# Patient Record
Sex: MALE | Race: WHITE | NOT HISPANIC OR LATINO | Employment: OTHER | URBAN - NONMETROPOLITAN AREA
[De-identification: names, ages, dates, MRNs, and addresses within clinical notes are randomized per-mention and may not be internally consistent; named-entity substitution may affect disease eponyms.]

---

## 2018-04-11 ENCOUNTER — OFFICE VISIT CONVERTED (OUTPATIENT)
Dept: FAMILY MEDICINE CLINIC | Age: 73
End: 2018-04-11
Attending: NURSE PRACTITIONER

## 2018-04-18 ENCOUNTER — OFFICE VISIT CONVERTED (OUTPATIENT)
Dept: FAMILY MEDICINE CLINIC | Age: 73
End: 2018-04-18
Attending: NURSE PRACTITIONER

## 2018-08-02 ENCOUNTER — OFFICE VISIT CONVERTED (OUTPATIENT)
Dept: FAMILY MEDICINE CLINIC | Age: 73
End: 2018-08-02
Attending: FAMILY MEDICINE

## 2019-10-16 ENCOUNTER — HOSPITAL ENCOUNTER (OUTPATIENT)
Dept: OTHER | Facility: HOSPITAL | Age: 74
Discharge: HOME | End: 2019-10-16
Attending: FAMILY MEDICINE

## 2019-10-16 ENCOUNTER — OFFICE VISIT CONVERTED (OUTPATIENT)
Dept: FAMILY MEDICINE CLINIC | Age: 74
End: 2019-10-16
Attending: FAMILY MEDICINE

## 2019-10-16 LAB
ALBUMIN SERPL-MCNC: 4.7 G/DL (ref 3.5–5)
ALBUMIN/GLOB SERPL: 1.7 {RATIO} (ref 1.4–2.6)
ALP SERPL-CCNC: 76 U/L (ref 56–155)
ALT SERPL-CCNC: 31 U/L (ref 10–40)
ANION GAP SERPL CALC-SCNC: 27 MMOL/L (ref 8–19)
AST SERPL-CCNC: 22 U/L (ref 15–50)
BILIRUB SERPL-MCNC: 0.58 MG/DL (ref 0.2–1.3)
BUN SERPL-MCNC: 17 MG/DL (ref 5–25)
BUN/CREAT SERPL: 14 {RATIO} (ref 6–20)
CALCIUM SERPL-MCNC: 10.3 MG/DL (ref 8.7–10.4)
CHLORIDE SERPL-SCNC: 110 MMOL/L (ref 99–111)
CHOLEST SERPL-MCNC: 239 MG/DL (ref 107–200)
CHOLEST/HDLC SERPL: 6 {RATIO} (ref 3–6)
CONV CO2: 20 MMOL/L (ref 22–32)
CONV TOTAL PROTEIN: 7.4 G/DL (ref 6.3–8.2)
CREAT UR-MCNC: 1.23 MG/DL (ref 0.7–1.2)
GFR SERPLBLD BASED ON 1.73 SQ M-ARVRAT: 58 ML/MIN/{1.73_M2}
GLOBULIN UR ELPH-MCNC: 2.7 G/DL (ref 2–3.5)
GLUCOSE SERPL-MCNC: 110 MG/DL (ref 70–99)
HDLC SERPL-MCNC: 40 MG/DL (ref 40–60)
LDLC SERPL CALC-MCNC: 156 MG/DL (ref 70–100)
OSMOLALITY SERPL CALC.SUM OF ELEC: 316 MOSM/KG (ref 273–304)
POTASSIUM SERPL-SCNC: 4.8 MMOL/L (ref 3.5–5.3)
PSA SERPL-MCNC: 5.89 NG/ML (ref 0–4)
SODIUM SERPL-SCNC: 152 MMOL/L (ref 135–147)
TRIGL SERPL-MCNC: 215 MG/DL (ref 40–150)
VLDLC SERPL-MCNC: 43 MG/DL (ref 5–37)

## 2019-10-30 ENCOUNTER — HOSPITAL ENCOUNTER (OUTPATIENT)
Dept: OTHER | Facility: HOSPITAL | Age: 74
Discharge: HOME | End: 2019-10-30
Attending: FAMILY MEDICINE

## 2019-10-30 LAB
ALBUMIN SERPL-MCNC: 4.2 G/DL (ref 3.5–5)
ALBUMIN/GLOB SERPL: 1.7 {RATIO} (ref 1.4–2.6)
ALP SERPL-CCNC: 71 U/L (ref 56–155)
ALT SERPL-CCNC: 27 U/L (ref 10–40)
ANION GAP SERPL CALC-SCNC: 17 MMOL/L (ref 8–19)
AST SERPL-CCNC: 19 U/L (ref 15–50)
BILIRUB SERPL-MCNC: 0.41 MG/DL (ref 0.2–1.3)
BUN SERPL-MCNC: 17 MG/DL (ref 5–25)
BUN/CREAT SERPL: 13 {RATIO} (ref 6–20)
CALCIUM SERPL-MCNC: 9.6 MG/DL (ref 8.7–10.4)
CHLORIDE SERPL-SCNC: 105 MMOL/L (ref 99–111)
CONV CO2: 23 MMOL/L (ref 22–32)
CONV TOTAL PROTEIN: 6.7 G/DL (ref 6.3–8.2)
CREAT UR-MCNC: 1.27 MG/DL (ref 0.7–1.2)
GFR SERPLBLD BASED ON 1.73 SQ M-ARVRAT: 55 ML/MIN/{1.73_M2}
GLOBULIN UR ELPH-MCNC: 2.5 G/DL (ref 2–3.5)
GLUCOSE SERPL-MCNC: 99 MG/DL (ref 70–99)
OSMOLALITY SERPL CALC.SUM OF ELEC: 292 MOSM/KG (ref 273–304)
POTASSIUM SERPL-SCNC: 4.5 MMOL/L (ref 3.5–5.3)
SODIUM SERPL-SCNC: 140 MMOL/L (ref 135–147)

## 2020-03-31 ENCOUNTER — OFFICE VISIT CONVERTED (OUTPATIENT)
Dept: FAMILY MEDICINE CLINIC | Age: 75
End: 2020-03-31
Attending: FAMILY MEDICINE

## 2020-04-06 ENCOUNTER — OFFICE VISIT CONVERTED (OUTPATIENT)
Dept: FAMILY MEDICINE CLINIC | Age: 75
End: 2020-04-06
Attending: FAMILY MEDICINE

## 2020-04-07 ENCOUNTER — HOSPITAL ENCOUNTER (OUTPATIENT)
Dept: OTHER | Facility: HOSPITAL | Age: 75
Discharge: HOME | End: 2020-04-07
Attending: FAMILY MEDICINE

## 2020-04-07 LAB
ALBUMIN SERPL-MCNC: 4 G/DL (ref 3.5–5)
ALBUMIN/GLOB SERPL: 1.6 {RATIO} (ref 1.4–2.6)
ALP SERPL-CCNC: 65 U/L (ref 56–155)
ALT SERPL-CCNC: 36 U/L (ref 10–40)
ANION GAP SERPL CALC-SCNC: 16 MMOL/L (ref 8–19)
APPEARANCE UR: CLEAR
AST SERPL-CCNC: 28 U/L (ref 15–50)
BACTERIA UR QL AUTO: ABNORMAL
BASOPHILS # BLD MANUAL: 0.02 10*3/UL (ref 0–0.2)
BASOPHILS NFR BLD MANUAL: 0.2 % (ref 0–3)
BILIRUB SERPL-MCNC: 0.46 MG/DL (ref 0.2–1.3)
BILIRUB UR QL: NEGATIVE
BUN SERPL-MCNC: 24 MG/DL (ref 5–25)
BUN/CREAT SERPL: 21 {RATIO} (ref 6–20)
CALCIUM SERPL-MCNC: 9.4 MG/DL (ref 8.7–10.4)
CASTS URNS QL MICRO: ABNORMAL /[LPF]
CHLORIDE SERPL-SCNC: 105 MMOL/L (ref 99–111)
COLOR UR: YELLOW
CONV CO2: 23 MMOL/L (ref 22–32)
CONV LEUKOCYTE ESTERASE: NEGATIVE
CONV TOTAL PROTEIN: 6.5 G/DL (ref 6.3–8.2)
CONV UROBILINOGEN IN URINE BY AUTOMATED TEST STRIP: 0.2 {EHRLICHU}/DL (ref 0.1–1)
CREAT UR-MCNC: 1.16 MG/DL (ref 0.7–1.2)
DEPRECATED RDW RBC AUTO: 41.7 FL
EOSINOPHIL # BLD MANUAL: 0.65 10*3/UL (ref 0–0.7)
EOSINOPHIL NFR BLD MANUAL: 6.5 % (ref 0–7)
EPI CELLS #/AREA URNS HPF: ABNORMAL /[HPF]
ERYTHROCYTE [DISTWIDTH] IN BLOOD BY AUTOMATED COUNT: 13.2 % (ref 11.5–14.5)
GFR SERPLBLD BASED ON 1.73 SQ M-ARVRAT: >60 ML/MIN/{1.73_M2}
GLOBULIN UR ELPH-MCNC: 2.5 G/DL (ref 2–3.5)
GLUCOSE 24H UR-MCNC: NEGATIVE MG/DL
GLUCOSE SERPL-MCNC: 108 MG/DL (ref 70–99)
GRANS (ABSOLUTE): 6.27 10*3/UL (ref 2–8)
GRANS: 62.3 % (ref 30–85)
HBA1C MFR BLD: 15.1 G/DL (ref 14–18)
HCT VFR BLD AUTO: 44.1 % (ref 42–52)
HGB UR QL STRIP: NEGATIVE
IMM GRANULOCYTES # BLD: 0.02 10*3/UL (ref 0–0.54)
IMM GRANULOCYTES NFR BLD: 0.2 % (ref 0–0.43)
KETONES UR QL STRIP: NEGATIVE MG/DL
LYMPHOCYTES # BLD MANUAL: 2.24 10*3/UL (ref 1–5)
LYMPHOCYTES NFR BLD MANUAL: 8.5 % (ref 3–10)
MCH RBC QN AUTO: 29 PG (ref 27–31)
MCHC RBC AUTO-ENTMCNC: 34.2 G/DL (ref 33–37)
MCV RBC AUTO: 84.8 FL (ref 80–96)
MONOCYTES # BLD AUTO: 0.86 10*3/UL (ref 0.2–1.2)
MUCOUS THREADS URNS QL MICRO: ABNORMAL
NITRITE UR-MCNC: NEGATIVE MG/ML
OSMOLALITY SERPL CALC.SUM OF ELEC: 295 MOSM/KG (ref 273–304)
PH UR STRIP.AUTO: 5.5 [PH] (ref 5–8)
PLATELET # BLD AUTO: 176 10*3/UL (ref 130–400)
PMV BLD AUTO: 10.5 FL (ref 7.4–10.4)
POTASSIUM SERPL-SCNC: 4.4 MMOL/L (ref 3.5–5.3)
PROT UR-MCNC: NEGATIVE MG/DL
RBC # BLD AUTO: 5.2 10*6/UL (ref 4.7–6.1)
RBC # BLD AUTO: ABNORMAL /[HPF]
SODIUM SERPL-SCNC: 140 MMOL/L (ref 135–147)
SP GR UR STRIP: >=1.03 (ref 1–1.03)
SPECIMEN SOURCE: ABNORMAL
UNIDENT CRYS URNS QL MICRO: ABNORMAL /[HPF]
VARIANT LYMPHS NFR BLD MANUAL: 22.3 % (ref 20–45)
WBC # BLD AUTO: 10.06 10*3/UL (ref 4.8–10.8)
WBC #/AREA URNS HPF: ABNORMAL /[HPF]

## 2020-04-24 ENCOUNTER — OFFICE VISIT CONVERTED (OUTPATIENT)
Dept: FAMILY MEDICINE CLINIC | Age: 75
End: 2020-04-24
Attending: FAMILY MEDICINE

## 2021-05-18 NOTE — PROGRESS NOTES
Jeff Atkinson  1945     Office/Outpatient Visit    Visit Date: Tue, Mar 31, 2020 02:28 pm    Provider: Elda Louis MD (Assistant: Margaux Anand RN)    Location: [facility]        Electronically signed by Elda Louis MD on  03/31/2020 05:36:00 PM                             Subjective:        CC: Mr. Atkinson is a 74 year old White male.  States chronic back pain is really bothering him         HPI: Jeff's telehealth visit today is about low back pain.        He took a bad step a few days ago, and ever since, his low back has been giving him a lot of pain.  Felt a twitch in the back but no pain initially.  Worse when he first wakes up in the morning.    He was going to the chiropractor and that really helped, but now he can't get in there d/t COVID-19.  He has been doing some gentle traction and some low back exercises.  He has been alternating ice/heat.    ROS:     CONSTITUTIONAL:  Negative for fatigue and fever.      CARDIOVASCULAR:  Negative for chest pain and palpitations.      RESPIRATORY:  Negative for recent cough and dyspnea.      MUSCULOSKELETAL:  Positive for back pain.   Negative for arthralgias or myalgias.      NEUROLOGICAL:  Negative for paresthesias and weakness.          Past Medical History / Family History / Social History:         Last Reviewed on 3/31/2020 02:42 PM by Elda Louis    Past Medical History:             PAST MEDICAL HISTORY         Skin cancer: dx'd in ; BCC;     polio as a 8 year old per patient         CURRENT MEDICAL PROVIDERS:    NONE             ADVANCED DIRECTIVES: None         PREVENTIVE HEALTH MAINTENANCE             COLORECTAL CANCER SCREENING: declines colorectal cancer screening, understands reason for testing     Hepatitis C Medicare Screening: was last done 4-11-18; negative     PSA: was last done 4-11-18 which was abnormal         Surgical History:         Tonsillectomy/Adenoidectomy; at age <6     Other Surgeries:     benign tumors removed from his side in his twentiies;     Negative for    Colonoscopy ( PT DECLINES COLONOSCOPY ON VISIT 8-26-15./pr );         Family History: mom is 103     Father:  at age 78; Cause of death was CVA;  Hypertension     Mother: Hypertension;  Cerebrovascular Accident; Dementia;  Osteoarthritis     Brother(s): 0 brother(s) total     Sister(s): 0 sister(s) total         Social History: homes in [facility] and [facility]     Occupation: Retired (Prior occupation: businessman/ /corporate/)     Marital Status:      Children: 4 children         Tobacco/Alcohol/Supplements:     Last Reviewed on 3/31/2020 02:42 PM by Elda Louis    Tobacco: He has never smoked.          Alcohol: Frequency:    occ bourbon;         Substance Abuse History:     Last Reviewed on 3/31/2020 02:42 PM by Elda Louis    None         Mental Health History:     Last Reviewed on 3/31/2020 02:42 PM by Elda Louis        Communicable Diseases (eg STDs):     Last Reviewed on 3/31/2020 02:42 PM by Elda Luois        Current Problems:     Last Reviewed on 10/16/2019 10:47 AM by Elda Louis    Low back pain    Encounter for screening for diabetes mellitus    Chronic low back pain    Screening for diabetes mellitus    Low back pain    Pure hyperglyceridemia    Elevated cholesterol        Immunizations:     Prevnar 13 (Pneumococcal PCV 13) 10/7/2016    zzFluzone pf-quadrivalent 3 and up 2015    Afluria 2018    Flulaval 10/8/2010    Fluzone High-Dose pf (>=65 yr) 10/16/2019    PNEUMOVAX 23 (Pneumococcal PPV23) 3/5/2018        Allergies:     Last Reviewed on 10/16/2019 10:47 AM by Elda Louis    Cipro:      Adhesive tape:      Novocain:          Current Medications:     Last Reviewed on 10/16/2019 10:47 AM by Elda Louis    Loratadine     Glucosamine     Saw Palmetto     Multivitamins  Capsules [Take 1 capsule(s) by mouth daily]    stinging nettle         Assessment:         M54.5   Low back pain           ORDERS:         Meds Prescribed:       [Queued New Rx] cyclobenzaprine 10 mg oral tablet [take 1 tablet (10 mg) by oral route 2 times per day prn], #15 (fifteen) tablets, Refills: 0 (zero)                 Plan:         Low back painAcute on chronic low back pain.  Trying a muscle relaxer as below and continue conservative measures of stretching, exercises, heat and ice.  Call prn worsening or failure to improve of sx.          Extensive discussion today about why hydroxychloroquine is not appropriate to be used for Jeff and his wife as prophylaxis for COVID-19.    Telehealth: Verbal consent obtained for visit to occur via phone call; Staff, other than provider, present during telephone visit include Margaux Anand RN; Total time spent was 22 minutes; [id number]--[place] E/M 21-30 minutes           Prescriptions:       [Queued New Rx] cyclobenzaprine 10 mg oral tablet [take 1 tablet (10 mg) by oral route 2 times per day prn], #15 (fifteen) tablets, Refills: 0 (zero)             Charge Capture:         Primary Diagnosis:     M54.5  Low back pain           Orders:      16947  Phys/QHP telephone evaluation 21-30 minutes  (In-House)

## 2021-05-18 NOTE — PROGRESS NOTES
"Jeff Atkinson  1945     Office/Outpatient Visit    Visit Date: Mon, Apr 6, 2020 12:59 pm    Provider: Daniel Sanchez MD (Assistant: Gabrielle Norwood MA)    Location: [facility]        Electronically signed by Daniel Sanchez MD on  04/06/2020 06:21:58 PM                             Subjective:        CC: Mr. Atkinson is a 74 year old White male.  back pain flare up, unable to go to chiropractor         HPI:       Pt has low back pain, very low back, across the back and \"Both sides\", its so intense its hard to localize. This morning pain was burning, then aching, also throbbing. He was seeing a chircopractor which helped but he can't see her now due to COVID 19 restrictions. Pain worse with sleeping on back, worse when getting up out of a chair, a little better with traction. Also has pain in buttock and hips. He has pain in the anterior leg, not posterior leg. His friend loaned him a miles chair and this has helped. Back pain started 2 years ago and has gradually worsened.    ROS:     CONSTITUTIONAL:  Negative for fatigue and fever.      CARDIOVASCULAR:  Negative for chest pain and palpitations.      RESPIRATORY:  Negative for recent cough and dyspnea.      MUSCULOSKELETAL:  Positive for back pain ( acute ) and limb pain.   Negative for arthralgias or myalgias.      NEUROLOGICAL:  Negative for paresthesias and weakness.          Past Medical History / Family History / Social History:         Last Reviewed on 4/06/2020 01:12 PM by Daniel Sanchez    Past Medical History:             PAST MEDICAL HISTORY         Skin cancer: dx'd in ; BCC;     polio as a 8 year old per patient         CURRENT MEDICAL PROVIDERS:    NONE             ADVANCED DIRECTIVES: None         PREVENTIVE HEALTH MAINTENANCE             COLORECTAL CANCER SCREENING: declines colorectal cancer screening, understands reason for testing     Hepatitis C Medicare Screening: was last done 4-11-18; negative     " PSA: was last done 18 which was abnormal         Surgical History:         Tonsillectomy/Adenoidectomy; at age <6     Other Surgeries:    benign tumors removed from his side in his twentiies;     Negative for    Colonoscopy ( PT DECLINES COLONOSCOPY ON VISIT 8-26-15./pr );         Family History: mom is 103     Father:  at age 78; Cause of death was CVA;  Hypertension     Mother: Hypertension;  Cerebrovascular Accident; Dementia;  Osteoarthritis     Brother(s): 0 brother(s) total     Sister(s): 0 sister(s) total         Social History: homes in [facility]     Occupation: Retired (Prior occupation: businessman/ /corporate/)     Marital Status:      Children: 4 children         Tobacco/Alcohol/Supplements:     Last Reviewed on 2020 01:12 PM by Daniel Sanchez    Tobacco: He has never smoked.          Alcohol: Frequency:    occ bourbon;         Substance Abuse History:     Last Reviewed on 2020 01:12 PM by Daniel Sanchez    None         Mental Health History:     Last Reviewed on 2020 01:12 PM by Daniel Sanchez        Communicable Diseases (eg STDs):     Last Reviewed on 2020 01:12 PM by Daniel Sanchez        Current Problems:     Last Reviewed on 2020 01:12 PM by Daniel Sanchez    Low back pain    Pure hyperglyceridemia        Immunizations:     Prevnar 13 (Pneumococcal PCV 13) 10/7/2016    zzFluzone pf-quadrivalent 3 and up 2015    Afluria 2018    Flulaval 10/8/2010    Fluzone High-Dose pf (>=65 yr) 10/16/2019    PNEUMOVAX 23 (Pneumococcal PPV23) 3/5/2018        Allergies:     Last Reviewed on 2020 01:12 PM by Daniel Sanchez    Cipro:      Adhesive tape:      Novocain:          Current Medications:     Last Reviewed on 2020 01:12 PM by Daniel Sanchez    Loratadine     Glucosamine     Saw Palmetto     multivitamin oral capsule [Take 1 capsule(s) by mouth daily]    stinging nettle      cyclobenzaprine 10 mg oral tablet [take 1 tablet (10 mg) by oral route 2 times per day prn]        Assessment:         M54.5   Low back pain           ORDERS:         Meds Prescribed:       [New Rx] naproxen 250 mg oral tablet [take 1 tablet (250 mg) by oral route 2 times per day with food], #50 (fifty) tablets, Refills: 0 (zero)         Radiology/Test Orders:       31104  Radiologic examination, spine, lumbosacral;  minimum of four views  (Send-Out)              Lab Orders:       [id number]  [facility] CBC with 3 part diff  (Send-Out)            43148  [facility] Comp. Metabolic Panel  (Send-Out)            78080  [facility] Urinalysis, automated, with micro  (Send-Out)                      Plan:         Low back painThis likely represents a pulled muscle or muscle spasm of lower back but this is difficult to assess over phone visit. Pt does not want any narcotic pain meds or a muscle relaxer so he is advised to use NSAIDs. Naproxen prescribed per his request. Pt advised to please get labs to assess kidney function and x-ray is ordered for when he can come in.     LABORATORY:  Labs ordered to be performed today include CBC, Comprehensive metabolic panel, and urinalysis with micro.      RADIOLOGY:  I have ordered Lumbar/Sacral Spine X-ray to be done today.  Telehealth: Verbal consent obtained for visit to occur via phone call; Staff, other than provider, present during telephone visit include Gabrielle Norwood; Total time spent was 17 minutes; [id number]--Awviaabsv E/M 11-20 minutes           Prescriptions:       [New Rx] naproxen 250 mg oral tablet [take 1 tablet (250 mg) by oral route 2 times per day with food], #50 (fifty) tablets, Refills: 0 (zero)           Orders:       [id number]  [facility] CBC with 3 part diff  (Send-Out)            25640  [facility] Comp. Metabolic Panel  (Send-Out)            71064  Radiologic examination, spine, lumbosacral;  minimum of four views  (Send-Out)            85101  [facility]  automated, with micro  (Send-Out)                  Charge Capture:         Primary Diagnosis:     M54.5  Low back pain           Orders:      [id number]  Phys/QHP telephone evaluation 11-20 minutes  (In-House)

## 2021-05-18 NOTE — PROGRESS NOTES
Jeff Atkinson 1945     Office/Outpatient Visit    Visit Date: Wed, Apr 18, 2018 10:01 am    Provider: Stephanie Simental N.P. (Assistant: Daisy Huber MA)    Location: [facility]        Electronically signed by Stephanie Simental N.P. on  04/18/2018 05:12:06 PM                             SUBJECTIVE:        CC:     Mr. Atkinson is a 72 year old White male.  This is a follow-up visit.  Discuss Labs         HPI:         Patient presents with elevated cholesterol.  Current treatment includes none  other than some herbal supplements.  Most recent lab tests include Total Cholesterol:  256 (mg/dL) (04/11/2018), HDL:  39 (mg/dL) (04/11/2018), Triglycerides:  224 (mg/dL) (04/11/2018), LDL:  172 (mg/dL) (04/11/2018).      elevated PSA     There are no associated symptoms.  Prior work-up has included PSA 6.44.      ROS:     CONSTITUTIONAL:  Negative for chills, fatigue, fever, and weight change.      CARDIOVASCULAR:  Negative for chest pain, palpitations, tachycardia, orthopnea, and edema.      RESPIRATORY:  Negative for cough, dyspnea, and hemoptysis.      GENITOURINARY:  Negative for dysuria and change in urine stream.      NEUROLOGICAL:  Negative for dizziness, headaches, paresthesias, and weakness.          PMH/FMH/SH:     Last Reviewed on 4/11/2018 10:20 AM by Stephanie Simental    Past Medical History:             PAST MEDICAL HISTORY         Skin cancer: dx'd in ; BCC;     polio as a 8 year old per patient         CURRENT MEDICAL PROVIDERS:    NONE             ADVANCED DIRECTIVES: None         PREVENTIVE HEALTH MAINTENANCE             COLORECTAL CANCER SCREENING: declines colorectal cancer screening, understands reason for testing     Hepatitis C Medicare Screening: was last done 4-11-18; negative         Surgical History:         Tonsillectomy/Adenoidectomy; at age <6     Other Surgeries:    benign tumors removed from his side in his twentiies;     Negative for    Colonoscopy ( PT  DECLINES COLONOSCOPY ON VISIT 8-26-15./pr );         Family History: mom is 103     Father:  at age 78; Cause of death was CVA;  Hypertension     Mother: Hypertension;  Cerebrovascular Accident; Dementia;  Osteoarthritis     Brother(s): 0 brother(s) total     Sister(s): 0 sister(s) total         Social History: homes in [facility]     Occupation: Retired (Prior occupation: businessman/ /corporate/)     Marital Status:      Children: 4 children         Tobacco/Alcohol/Supplements:     Last Reviewed on 2018 10:04 AM by Gabrielle Norwood    Tobacco: He has never smoked.          Alcohol: Frequency:    occ bourbon;         Substance Abuse History:     Last Reviewed on 10/07/2016 12:55 PM by Elda Louis         Mental Health History:     Last Reviewed on 10/07/2016 12:55 PM by Elda Louis        Communicable Diseases (eg STDs):     Last Reviewed on 10/07/2016 12:55 PM by Elda Louis            Immunizations:     Prevnar 13 (Pneumococcal PCV 13) 10/7/2016     zzFluzone pf-quadrivalent 3 and up 2015     Afluria 2018     Flulaval 10/8/2010     PNEUMOVAX 23 (Pneumococcal PPV23) 3/5/2018         Allergies:     Last Reviewed on 2018 10:05 AM by Daisy Huber    Cipro:    Novocain:    Adhesive tape:        Current Medications:     Last Reviewed on 2018 10:05 AM by Daisy Huber    stinging nettle     Glucosamine     Multivitamins Capsules Take 1 capsule(s) by mouth daily     Saw Palmetto     Loratadine         OBJECTIVE:        Vitals:         Current: 2018 10:03:44 AM    Ht:  5 ft, 7 in;  Wt: 234 lbs;  BMI: 36.6    T: 97.3 F (oral);  BP: 132/86 mm Hg (left arm, sitting);  P: 67 bpm (left arm (BP Cuff), sitting);  sCr: 1.3 mg/dL;  GFR: 54.98        Exams:     PHYSICAL EXAM:     GENERAL: Vitals recorded well developed, well nourished;  well groomed;  no apparent distress;     PSYCHIATRIC:  appropriate affect and  demeanor; normal speech pattern; grossly normal memory;         ASSESSMENT           272.0   E78.1  Elevated cholesterol              DDx:     790.93   R97.20  Elevated PSA              DDx:         ORDERS:         Lab Orders:       FUTURE  Future order to be done at patients convenience  (Send-Out)         60925  [facility] Basic Metabolic Panel  (Send-Out)         55783  PSA [facility] PSA DIAGNOSTIC  (Send-Out)         FUTURE  Future order to be done at patients convenience  (Send-Out)         83799  [facility] Lipid Panel  (Send-Out)                   PLAN:          Elevated cholesterol reviewed labs with patient and does not believe in treating high cholesterol, he believes that low cholesterol is dangerous; reviewed risk for CAD         RECOMMENDATIONS given include: exercise, low cholesterol/low fat diet, and weight loss.      FOLLOW-UP: repeat labs     FOLLOW-UP TESTING #1: FOLLOW-UP LABORATORY:  Labs to be scheduled in the future include lipid panel.            Orders:       FUTURE  Future order to be done at patients convenience  (Send-Out)         27101  [facility] Lipid Panel  (Send-Out)            Elevated PSA I had referred him to urology regarding elevated PSA, reviewed lab, he declines evaluation at this time; reviewed his renal function was off, recommend repeat PSA & BMP (consider treatment with antibiotics)         FOLLOW-UP TESTING #1: FOLLOW-UP LABORATORY:  Labs to be scheduled in the future include BMP and PSA Diagnostic Reason.            Orders:       FUTURE  Future order to be done at patients convenience  (Send-Out)         55959  [facility] Basic Metabolic Panel  (Send-Out)         23792  PSA [facility] PSA DIAGNOSTIC  (Send-Out)               Patient Recommendations:        For  Elevated cholesterol:     Maintain a regular exercise program. Reduce the amount of cholesterol and saturated fat in your diet. Try to lose some weight; even modest weight reduction can improve your blood pressure.           The following laboratory testing has been ordered: lipid panel         For  Elevated PSA:             The following laboratory testing has been ordered: metabolic panel, basic             CHARGE CAPTURE           **Please note: ICD descriptions below are intended for billing purposes only and may not represent clinical diagnoses**        Primary Diagnosis:         272.0 Elevated cholesterol            E78.1    Pure hyperglyceridemia              Orders:          20260   Office/outpatient visit; established patient, level 3  (In-House)           790.93 Elevated PSA            R97.20    Elevated prostate specific antigen [PSA]

## 2021-05-18 NOTE — PROGRESS NOTES
Jeff Atkinson 1945     Office/Outpatient Visit    Visit Date: Thu, Aug 2, 2018 10:32 am    Provider: Elda Louis MD (Assistant: Kayy Huber LPN)    Location: [facility]        Electronically signed by Elda Louis MD on  08/02/2018 11:10:11 AM                             SUBJECTIVE:        CC:     Mr. Atkinson is a 73 year old White male.  This is a follow-up visit.  back pain         HPI: Jeff is here today to talk about scoliosis.  He had an episode of severe low back pain back in April to May.  He has had intermittent back problems through the past.  He went to the chiropractor who told him that one of his legs were shorter than the other.  He has been working with him since then.  Jeff has been doing his exercises regularly but he gets stiff with sitting for long periods.  He got XRs done there and brought the films in today for me to look at.  He is leaving for [facility] in about 3 weeks for the next 6-8 months.  He wants to review his recent labs and the XRs to see if there is anything he needs to worry about before he goes.         His recent labs showed a  and + as well as a PSA 6.4.      ROS:     CONSTITUTIONAL:  Negative for fatigue and fever.      EYES:  Negative for blurred vision.      E/N/T:  Negative for diminished hearing and nasal congestion.      CARDIOVASCULAR:  Negative for chest pain and palpitations.      RESPIRATORY:  Negative for recent cough and dyspnea.      GASTROINTESTINAL:  Negative for abdominal pain, constipation, diarrhea, nausea and vomiting.      MUSCULOSKELETAL:  Positive for back pain.   Negative for arthralgias or myalgias.      NEUROLOGICAL:  Negative for paresthesias and weakness.          PMH/FMH/SH:     Last Reviewed on 8/02/2018 10:41 AM by Elda Louis    Past Medical History:             PAST MEDICAL HISTORY         Skin cancer: dx'd in ; BCC;     polio as a 8 year old per patient         CURRENT MEDICAL  PROVIDERS:    NONE             ADVANCED DIRECTIVES: None         PREVENTIVE HEALTH MAINTENANCE             COLORECTAL CANCER SCREENING: declines colorectal cancer screening, understands reason for testing     Hepatitis C Medicare Screening: was last done 18; negative         Surgical History:         Tonsillectomy/Adenoidectomy; at age <6     Other Surgeries:    benign tumors removed from his side in his twentiies;     Negative for    Colonoscopy ( PT DECLINES COLONOSCOPY ON VISIT 8-26-15./pr );         Family History: mom is 103     Father:  at age 78; Cause of death was CVA;  Hypertension     Mother: Hypertension;  Cerebrovascular Accident; Dementia;  Osteoarthritis     Brother(s): 0 brother(s) total     Sister(s): 0 sister(s) total         Social History: homes in [place] [facility] and [place] [facility]     Occupation: Retired (Prior occupation: businessman/ /corporate/)     Marital Status:      Children: 4 children         Tobacco/Alcohol/Supplements:     Last Reviewed on 2018 10:41 AM by Elda Louis    Tobacco: He has never smoked.          Alcohol: Frequency:    occ bourbon;         Substance Abuse History:     Last Reviewed on 2018 10:41 AM by Elda Louis    None         Mental Health History:     Last Reviewed on 2018 10:41 AM by Elda Louis        Communicable Diseases (eg STDs):     Last Reviewed on 2018 10:41 AM by Elda Louis            Current Problems:     Last Reviewed on 10/07/2016 12:55 PM by Elda Louis    Elevated cholesterol     Family history of stroke (CVA)     Low back pain     Screening for diabetes mellitus     Chronic low back pain     Encounter for removal of sutures     Basal cell carcinoma         Immunizations:     Prevnar 13 (Pneumococcal PCV 13) 10/7/2016     zzFluzone pf-quadrivalent 3 and up 2015     Afluria 2018     Flulaval 10/8/2010     PNEUMOVAX 23 (Pneumococcal PPV23) 3/5/2018          Allergies:     Last Reviewed on 4/18/2018 10:05 AM by Daisy Huber    Cipro:    Novocain:        Current Medications:     Last Reviewed on 4/18/2018 10:05 AM by Daisy Huber    stinging nettle     Glucosamine     Multivitamins Capsules Take 1 capsule(s) by mouth daily     Saw Palmetto     Loratadine         OBJECTIVE:        Vitals:         Current: 8/2/2018 10:34:59 AM    Ht:  5 ft, 7 in;  Wt: 239.2 lbs;  BMI: 37.5    T: 97.7 F (oral);  BP: 121/79 mm Hg (left arm, sitting);  P: 60 bpm (left arm (BP Cuff), sitting);  sCr: 1.3 mg/dL;  GFR: 54.72        Exams:     PHYSICAL EXAM:     GENERAL: Vitals recorded well developed, well nourished;  well groomed;  no apparent distress;     EYES: extraocular movements intact; conjunctiva and cornea are normal; PERRLA;     E/N/T: OROPHARYNX:  normal mucosa, dentition, gingiva, and posterior pharynx;     RESPIRATORY: normal respiratory rate and pattern with no distress; normal breath sounds with no rales, rhonchi, wheezes or rubs;     CARDIOVASCULAR: normal rate; rhythm is regular;  no systolic murmur; no edema;     GASTROINTESTINAL: nontender; normal bowel sounds;     MUSCULOSKELETAL: normal gait; normal overall tone         ASSESSMENT           724.2   M54.5  Low back pain              DDx:     272.0   E78.1  Elevated cholesterol              DDx:     790.93   R97.20  Elevated PSA              DDx:         ORDERS:         Radiology/Test Orders:       [id number]  Radiologic examination, spine, lumbosacral;  minimum of four views  (Send-Out)                   PLAN:          Low back pain Back pain is now resolved but the chiropractor told him he has scoliosis and he is very concerned about going down to [place] with this issue unresolved.  He has brought in very poor quality XR films today and I cannot decipher anything useful from them.  Will get actual films today with the radiologist to read and we will see if there is anything to worry about.  I have discussed with him that he  "really does not need an MRI at this time because the pain is resolved.         RADIOLOGY:  I have ordered Lumbar/Sacral Spine X-ray to be done today.            Orders:       [id number]  Radiologic examination, spine, lumbosacral;  minimum of four views  (Send-Out)             Patient Education Handouts:       [facility] Medication Compliance           Elevated cholesterol He absolutely declines any statin therapy.  \"I do not think cholesterol is an indication of heart disease.  It's all genetics.\"  Discussed that although LDL levels do not indicate CAD is present, they are a significant risk factor.  He still declines.  Discussed that at a minimum he should be avoiding sat fats and trans fats in his diet.           Elevated PSA PSA elevated at 6.4.  Referral to Urology was already recommended to him but he declined at that time.  \"I do not think I would have prostate cancer because it is not in my family and I do not have problems really.\"  However, he is taking saw palmetto to keep his sx of nocturia and frequency at bay, on further questioning.  Discussed that he may very well have prostate cancer with this elevation and the only way to be sure it is not prostate cancer is to see Urology and get a biopsy.  He still declines, although he does acknowledge that there is a potential risk he may have cancer.  He declines a repeat PSA in 6 months and states he will just repeat it in a year.             CHARGE CAPTURE           **Please note: ICD descriptions below are intended for billing purposes only and may not represent clinical diagnoses**        Primary Diagnosis:         724.2 Low back pain            M54.5    Low back pain              Orders:          31936   Office/outpatient visit; established patient, level 4  (In-House)           272.0 Elevated cholesterol            E78.1    Pure hyperglyceridemia    790.93 Elevated PSA            R97.20    Elevated prostate specific antigen [PSA]           "

## 2021-05-18 NOTE — PROGRESS NOTES
Jeff Atkinson 1945     Office/Outpatient Visit    Visit Date: Wed, Oct 16, 2019 10:31 am    Provider: Elda Louis MD (Assistant: Maureen Bojorquez MA)    Location: [facility]        Electronically signed by Elda Louis MD on  10/16/2019 01:34:04 PM                             SUBJECTIVE:        CC: Medicare wellness         HPI:     He is due for colonoscopy.  He is due for prostate cancer screening.  He is UTD on Pneumovax (3/2018), Prevnar (10/2016).  He is due for Shingrix, Havrix, Td, and flu.  He is due for routine labs including HLD panel and PSA.     Mr. Atkinson is here for a Medicare wellness visit.  ADVANCED DIRECTIVES: None             Returning to health checkup, self-Assessment of Health: He rates his health as very good. He rates his confidence of being able to control/manage most of his health problems as very confident. His physical/emotional health has limited his social activites not at all.  A review of cognitive impairment was performed, including ability to drive a car, manage finances, and any memory changes, and was found to be negative.  A review of functional ability, including bathing, dressing, walking, and urine/bowel continence as well as level of safety was performed and was found to be negative.  Falls Risk: Has not had any falls or only one fall without injury in the past year.  In regard to hearing, he reports having trouble hearing the TV/radio when others do not and having to strain to hear or understand conversations, but not wearing hearing aid(s).  Concerning home safety, he reports that at home he DOES have adequate lighting and grab bars in the bath, but not a skid resistant shower/tub, handrails on stairs, functioning smoke alarms or absence of throw rugs.  Physical Activity: He exercises but less than 20 minutes 3 days per week; Type of diet patient normally eats is described as well-balanced with fruits and vegetables Tobacco: He has never  smoked.  Preventative Health updated today.          PHQ-9 Depression Screening: Completed form scanned and in chart; Total Score 1     ROS:     CONSTITUTIONAL:  Negative for fatigue and fever.      EYES:  Negative for blurred vision.      E/N/T:  Positive for nasal congestion and frequent rhinorrhea.   Negative for diminished hearing.      CARDIOVASCULAR:  Negative for chest pain and palpitations.      RESPIRATORY:  Negative for recent cough and dyspnea.      GASTROINTESTINAL:  Negative for abdominal pain, constipation, diarrhea, nausea and vomiting.      GENITOURINARY:  Negative for nocturia and change in urine stream.      MUSCULOSKELETAL:  Positive for arthralgias and back pain.   Negative for myalgias.      INTEGUMENTARY:  Positive for suspicous mole ( L temple ).      NEUROLOGICAL:  Negative for paresthesias and weakness.      PSYCHIATRIC:  Negative for anxiety, depression and sleep disturbance.          PMH/FMH/SH:     Last Reviewed on 10/16/2019 10:47 AM by Elda Louis    Past Medical History:             PAST MEDICAL HISTORY         Skin cancer: dx'd in ; BCC;     polio as a 8 year old per patient         CURRENT MEDICAL PROVIDERS:    NONE             ADVANCED DIRECTIVES: None         PREVENTIVE HEALTH MAINTENANCE             COLORECTAL CANCER SCREENING: declines colorectal cancer screening, understands reason for testing     Hepatitis C Medicare Screening: was last done 18; negative     PSA: was last done 18 which was abnormal         Surgical History:         Tonsillectomy/Adenoidectomy; at age <6     Other Surgeries:    benign tumors removed from his side in his twentiies;     Negative for    Colonoscopy ( PT DECLINES COLONOSCOPY ON VISIT 8-26-15./pr );         Family History: mom is 103     Father:  at age 78; Cause of death was CVA;  Hypertension     Mother: Hypertension;  Cerebrovascular Accident; Dementia;  Osteoarthritis     Brother(s): 0 brother(s) total     Sister(s): 0  sister(s) total         Social History: homes in [facility], [facility] and [facility]     Occupation: Retired (Prior occupation: businessman/ /corporate/)     Marital Status:      Children: 4 children         Tobacco/Alcohol/Supplements:     Last Reviewed on 10/16/2019 10:47 AM by Elda Louis    Tobacco: He has never smoked.          Alcohol: Frequency:    occ bourbon;         Substance Abuse History:     Last Reviewed on 10/16/2019 10:47 AM by Elda Louis    None         Mental Health History:     Last Reviewed on 10/16/2019 10:47 AM by Elda Louis        Communicable Diseases (eg STDs):     Last Reviewed on 10/16/2019 10:47 AM by Elda Louis            Current Problems:     Last Reviewed on 8/02/2018 10:41 AM by Elda Louis    Elevated cholesterol     Family history of stroke (CVA)     Low back pain     Screening for diabetes mellitus     Chronic low back pain     Encounter for removal of sutures     Basal cell carcinoma         Immunizations:     Prevnar 13 (Pneumococcal PCV 13) 10/7/2016     zzFluzone pf-quadrivalent 3 and up 9/25/2015     Afluria 2/6/2018     Flulaval 10/8/2010     PNEUMOVAX 23 (Pneumococcal PPV23) 3/5/2018         Allergies:     Last Reviewed on 8/02/2018 10:41 AM by Elda Louis    Cipro:    Novocain:        Current Medications:     Last Reviewed on 8/02/2018 10:41 AM by Elda Louis    stinging nettle     Glucosamine     Multivitamins Capsules Take 1 capsule(s) by mouth daily     Saw Palmetto     Loratadine         OBJECTIVE:        Vitals:         Current: 10/16/2019 10:40:45 AM    Ht:  5 ft, 7 in;  Wt: 231.2 lbs;  BMI: 36.2    T: 97.6 F (oral);  BP: 131/78 mm Hg (right arm, sitting);  P: 61 bpm (right arm (BP Cuff), sitting);  sCr: 1.3 mg/dL;  GFR: 53.17    VA: 20/30 OD, 20/30 OS (with correction)        Exams:     PHYSICAL EXAM:     GENERAL: Vitals recorded well developed, well nourished;  well groomed;  no apparent distress;      EYES: extraocular movements intact; conjunctiva and cornea are normal; PERRLA;     E/N/T: OROPHARYNX:  normal mucosa, dentition, gingiva, and posterior pharynx;     RESPIRATORY: normal respiratory rate and pattern with no distress; normal breath sounds with no rales, rhonchi, wheezes or rubs;     CARDIOVASCULAR: normal rate; rhythm is regular;  no systolic murmur; no edema;     GASTROINTESTINAL: nontender; normal bowel sounds;     SKIN: atypical mole(s) mole is 5-6 mm in size, variegated in color, and enlarging in size (by patient history);     MUSCULOSKELETAL: normal gait; normal overall tone     NEUROLOGIC: mental status: alert and oriented x 3; reflexes: brachioradialis: 2+; knee jerks: 2+;     PSYCHIATRIC: appropriate affect and demeanor; normal psychomotor function; normal speech pattern;         Procedures:     Vaccination against other viral diseases, Influenza     1. Influenza high dose 0.5 ml unit dose, AS, ABN signed given IM in the right upper arm; administered by AS;  lot number [id number]; expires 05/16/2020 Regarding contraindications to an Influenza vaccine: Denies moderate/severe illness with/without fever; serious reaction to eggs, egg proteins, gentamicin, gelatin, arginine, neomycin or polymixin; serious reaction after recieving previous influenza vaccines; and history of Guillain-Menno Syndrome.              ASSESSMENT           V70.0   Z00.00  Health checkup              DDx:     V79.0   Z13.89  Screening for depression              DDx:     272.0   E78.1  Elevated cholesterol              DDx:     V76.44   Z12.5  Screening for prostate cancer              DDx:     238.2   D48.9  Atypical mole              DDx:     V04.81   Z23  Vaccination against other viral diseases, Influenza              DDx:         ORDERS:         Lab Orders:       [id number]  HTNLP - [facility] CMP AND LIPID: [id number]  (Send-Out)         *  PRSAS Medicare screening PSA  (Send-Out)         APPTO  Appointment need  (In-House)            Procedures Ordered:         Annual wellness visit, includes a PPPS, subsequent visit  (In-House)         REFER  Referral to Specialist or Other Facility  (Send-Out)         90678  Fluzone High Dose  (In-House)           Other Orders:         Depression screen negative  (In-House)         1101F  Pt screen for fall risk; document no falls in past year or only 1 fall w/o injury in past year (ROSANA)  (In-House)           Administration of influenza virus vaccine (x1)                 PLAN:          Health checkup He is due for colonoscopy, has never had done; declines this today.  He is due for prostate cancer screening; PSA ordered, KITTY declined.  He is UTD on Pneumovax (3/2018), Prevnar (10/2016).  He is due for Shingrix, Havrix, Td, and flu.  High dose flu given today.  Others can be done at the pharmacy.  He is due for routine labs including HLD panel and PSA; ordered.  No fall risk, no memory issues, no signs/symptoms of depression.  He lives with his wife.  He is able to drive and perform ADLs/manages finances independently.   Hearing is adequate.  He does not have a living will.  Preventive services handout and safety handout were given to him.  Current doctor list updated.  RTC 1 yr for AWV.     MIPS PHQ-9 Depression Screening: Completed form scanned and in chart; Total Score 1; Negative Depression Screen     FOLLOW-UP: in 1 year:.  Medicare wellness 30 min with [place]           Orders:         Annual wellness visit, includes a PPPS, subsequent visit  (In-House)           Depression screen negative  (In-House)         1101F  Pt screen for fall risk; document no falls in past year or only 1 fall w/o injury in past year (ROSANA)  (In-House)         APPTO  Appointment need  (In-House)            Elevated cholesterol     LABORATORY:  Labs ordered to be performed today include HTN/Lipid Panel: CMP, Lipid.            Orders:       [id number]  HTN - [facility] CMP AND LIPID: [id number]  (Send-Out)             Screening for prostate cancer     LABORATORY:  Labs ordered to be performed today include PSA Screening Medicare patients.            Orders:       *  PRSAS Medicare screening PSA  (Send-Out)            Atypical mole         REFERRALS:  Referral initiated to a dermatologist ( Dr. Jackie Montano; for evaluation of atypical pigmented nevus L temple, enlarging over the past months ).            Orders:       REFER  Referral to Specialist or Other Facility  (Send-Out)            Vaccination against other viral diseases, Influenza         IMMUNIZATIONS given today: Influenza HIGH Dose.            Orders:       [id number]  Fluzone High Dose  (In-House)                     Administration of influenza virus vaccine (x1)             Patient Recommendations:        For  Health checkup:                     APPOINTMENT INFORMATION:        Monday Tuesday Wednesday Thursday Friday Saturday Sunday            Time:___________________AM  PM   Date:_____________________             CHARGE CAPTURE           **Please note: ICD descriptions below are intended for billing purposes only and may not represent clinical diagnoses**        Primary Diagnosis:         V70.0 Health checkup            Z00.00    Encounter for general adult medical examination without abnormal findings              Orders:             Annual wellness visit, includes a PPPS, subsequent visit  (In-House)                Depression screen negative  (In-House)             1101F   Pt screen for fall risk; document no falls in past year or only 1 fall w/o injury in past year (ROSANA)  (In-House)             APPTO   Appointment need  (In-House)           V79.0 Screening for depression            Z13.89    Encounter for screening for other disorder    272.0 Elevated cholesterol            E78.1    Pure hyperglyceridemia    V76.44 Screening for prostate cancer            Z12.5    Encounter for screening for malignant neoplasm of prostate    238.2  Atypical mole            D48.9    Neoplasm of uncertain behavior, unspecified    V04.81 Vaccination against other viral diseases, Influenza            Z23    Encounter for immunization              Orders:          [id number]   Fluzone High Dose  (In-House)                                           Administration of influenza virus vaccine (x1)

## 2021-05-18 NOTE — PROGRESS NOTES
Jeff Atkinson  1945     Office/Outpatient Visit    Visit Date: Fri, Apr 24, 2020 11:00 am    Provider: Shelli Ortega MD (Assistant: Sandra Rasheed MA)    Location: [facility]        Electronically signed by Shelli Ortega MD on  04/27/2020 11:38:32 AM                             Subjective:        CC: not taking flexaril Mr. Atkinson is a 74 year old White male.  He presents with sore throat, earache right side, denies fever..          HPI:       ACUTE ONSET OF R MANDIBLE PAIN 1-2 DAYS AGO THAT PROGRESSED TO A SORE THROAT, WORSE WITH SWALLOWING, AND R EAR ACHE, h/o T and A    Patient to be evaluated for acute pharyngitis, unspecified.  These have been present since yesterday.  The symptoms include ear pain and sore throat.  He denies body aches, chest congestion, cough, fever, headache, nasal congestion, nasal discharge, sinus pain/pressure, sneezing or wheezing.  He denies exposure to ill contacts.  He has already tried to relieve the symptoms with nsaids (for back pain).      ROS:     CONSTITUTIONAL:  Negative for fatigue and fever.      EYES:  Negative for blurred vision.      E/N/T:  Negative for diminished hearing and nasal congestion.      CARDIOVASCULAR:  Negative for chest pain and palpitations.      RESPIRATORY:  Negative for recent cough and dyspnea.      GASTROINTESTINAL:  Negative for abdominal pain, constipation, diarrhea, nausea and vomiting.      MUSCULOSKELETAL:  Positive for back pain.   Negative for arthralgias or myalgias.      NEUROLOGICAL:  Negative for paresthesias and weakness.          Past Medical History / Family History / Social History:         Last Reviewed on 4/24/2020 11:53 AM by Shelli Ortega    Past Medical History:             PAST MEDICAL HISTORY         Skin cancer: dx'd in ; BCC;     polio as a 8 year old per patient         CURRENT MEDICAL PROVIDERS:    NONE             ADVANCED DIRECTIVES: None         PREVENTIVE HEALTH  MAINTENANCE             COLORECTAL CANCER SCREENING: declines colorectal cancer screening, understands reason for testing     Hepatitis C Medicare Screening: was last done 18; negative     PSA: was last done 18 which was abnormal         Surgical History:         Tonsillectomy/Adenoidectomy; at age <5     Other Surgeries:    benign tumors removed from his side in his twentiies;     Negative for    Colonoscopy ( PT DECLINES COLONOSCOPY ON VISIT 8-26-15./pr );         Family History: mom is 103     Father:  at age 78; Cause of death was CVA;  Hypertension     Mother: Hypertension;  Cerebrovascular Accident; Dementia;  Osteoarthritis     Brother(s): 0 brother(s) total     Sister(s): 0 sister(s) total         Social History: homes in [facility] and [facility]     Occupation: Retired (Prior occupation: businessman/ /corporate/)     Marital Status:      Children: 4 children         Tobacco/Alcohol/Supplements:     Last Reviewed on 2020 01:12 PM by Daniel Sanchez    Tobacco: He has never smoked.          Alcohol: Frequency:    occ bourbon;         Substance Abuse History:     Last Reviewed on 2020 01:12 PM by Daniel Sanchez    None         Mental Health History:     Last Reviewed on 2020 01:12 PM by Daniel Sanchez        Communicable Diseases (eg STDs):     Last Reviewed on 2020 01:12 PM by Daniel Sanchez        Allergies:     Last Reviewed on 2020 01:12 PM by Daniel Sanchez    Cipro:      Adhesive tape:      Novocain:          Current Medications:     Last Reviewed on 2020 01:12 PM by Daniel Sanchez    Loratadine     Glucosamine     Saw Palmetto     multivitamin oral capsule [Take 1 capsule(s) by mouth daily]    stinging nettle     cyclobenzaprine 10 mg oral tablet [take 1 tablet (10 mg) by oral route 2 times per day prn]    naproxen 250 mg oral tablet [take 1 tablet (250 mg) by oral route 2 times per day with food]         Objective:        Exams:     PHYSICAL EXAM:     GENERAL: Vitals recorded well developed, well nourished;  well groomed;  no apparent distress;     EYES: PERRL, EOMI     RESPIRATORY: normal respiratory rate and pattern with no distress;     NEUROLOGIC: mental status: oriented to person, place, and time;  GROSSLY INTACT     PSYCHIATRIC:  appropriate affect and demeanor; normal speech pattern; grossly normal memory;         Assessment:         J02.9   Acute pharyngitis, unspecified           Plan:         Acute pharyngitis, unspecifiedpt wanted a covid test, I told him that at this time his symptoms do not qualify him for testing, but if he develops a fever and or cough he can come in for testing since he is over 70,  I told him I was more concerned he had strept throat and/or ear infection and recommended he come in and be seen but he declined.  He will continue to monitor his temperatures.  I did give him the other testing sites info website www.[employer]/drivethru-testing or call 1-502.727.6565 (select option 1, then option 3) to see if she qualifies for drive thru testing who are now openly testing anyone who screens thru the site so he may call them, I advised he not use the NSAID and change to tylenol. In all honesty all he really wanted was to be placed on hydroxychloraquine and this is not an appropriate treatment for covid in early treatment of the disease.    Telehealth: Verbal consent obtained for visit to occur via televideo conferencing; Staff, other than provider, present during telephone visit include Lena Nickerson MA             Charge Capture:         Primary Diagnosis:     J02.9  Acute pharyngitis, unspecified           Orders:      [id number]  Office/outpatient visit; established patient, level 3  (In-House)

## 2021-05-25 ENCOUNTER — OFFICE VISIT CONVERTED (OUTPATIENT)
Dept: FAMILY MEDICINE CLINIC | Age: 76
End: 2021-05-25
Attending: FAMILY MEDICINE

## 2021-05-25 ENCOUNTER — HOSPITAL ENCOUNTER (OUTPATIENT)
Dept: OTHER | Facility: HOSPITAL | Age: 76
Discharge: HOME | End: 2021-05-25
Attending: FAMILY MEDICINE

## 2021-05-25 LAB
ALBUMIN SERPL-MCNC: 4.4 G/DL (ref 3.5–5)
ALBUMIN/GLOB SERPL: 1.8 {RATIO} (ref 1.4–2.6)
ALP SERPL-CCNC: 64 U/L (ref 56–155)
ALT SERPL-CCNC: 20 U/L (ref 10–40)
ANION GAP SERPL CALC-SCNC: 14 MMOL/L (ref 8–19)
AST SERPL-CCNC: 15 U/L (ref 15–50)
BILIRUB SERPL-MCNC: 0.43 MG/DL (ref 0.2–1.3)
BUN SERPL-MCNC: 22 MG/DL (ref 5–25)
BUN/CREAT SERPL: 18 {RATIO} (ref 6–20)
CALCIUM SERPL-MCNC: 9.7 MG/DL (ref 8.7–10.4)
CHLORIDE SERPL-SCNC: 104 MMOL/L (ref 99–111)
CHOLEST SERPL-MCNC: 262 MG/DL (ref 107–200)
CHOLEST/HDLC SERPL: 5.6 {RATIO} (ref 3–6)
CONV CO2: 26 MMOL/L (ref 22–32)
CONV TOTAL PROTEIN: 6.9 G/DL (ref 6.3–8.2)
CREAT UR-MCNC: 1.2 MG/DL (ref 0.7–1.2)
GFR SERPLBLD BASED ON 1.73 SQ M-ARVRAT: 59 ML/MIN/{1.73_M2}
GLOBULIN UR ELPH-MCNC: 2.5 G/DL (ref 2–3.5)
GLUCOSE SERPL-MCNC: 97 MG/DL (ref 70–99)
HDLC SERPL-MCNC: 47 MG/DL (ref 40–60)
LDLC SERPL CALC-MCNC: 185 MG/DL (ref 70–100)
OSMOLALITY SERPL CALC.SUM OF ELEC: 291 MOSM/KG (ref 273–304)
POTASSIUM SERPL-SCNC: 4.5 MMOL/L (ref 3.5–5.3)
SODIUM SERPL-SCNC: 139 MMOL/L (ref 135–147)
TRIGL SERPL-MCNC: 149 MG/DL (ref 40–150)
VLDLC SERPL-MCNC: 30 MG/DL (ref 5–37)

## 2021-06-05 NOTE — PROGRESS NOTES
Jeff Atkinson  1945     Office/Outpatient Visit    Visit Date: Tue, May 25, 2021 09:47 am    Provider: Elda Louis MD (Assistant: Kayy Huber LPN)    Location: [facility]        Electronically signed by Elda Louis MD on  05/25/2021 05:26:35 PM                             Subjective:        CC: Mr. Atkinson is a 76 year old White male.  pt is here for his MCW         HPI:       He is due for colonoscopy.  He is due for prostate cancer screening.  He is UTD on Pneumovax (3/2018), Prevnar (10/2016).  He is due for Shingrix, Td.  Flu shot not currently indicated.  He has had both COVID vaccines!  He is due for routine labs including HLD panel.    Mr. Atkinson is here for a Medicare wellness visit.  The required HRA questions are integrated within this visit note. Family medical history and individual medical/surgical history were reviewed and updated.  A current height, weight, BMI, blood pressure, and pulse were recorded in the vitals section of the note and have been reviewed. Patient's medications, including supplements, were recorded in the chart and reviewed.  Current providers and suppliers were reviewed and updated.          Self-Assessment of Health: He rates his health as very good. He rates his confidence of being able to control/manage most of his health problems as very confident. His physical/emotional health has limited his social activites slightly.  A review of cognitive impairment was performed, including ability to drive a car, manage finances, and any memory changes, and was found to be negative.  A review of functional ability, including bathing, dressing, walking, and urine/bowel continence as well as level of safety was performed and was found to be negative.  Falls Risk: Has not had any falls or only one fall without injury in the past year.  In regard to hearing, he reports wearing hearing aid(s), but not having trouble hearing the TV/radio when  others do not or having to strain to hear or understand conversations.  Concerning home safety, he reports that at home he DOES have adequate lighting, a skid resistant shower/tub and grab bars in the bath, but not handrails on stairs, functioning smoke alarms or absence of throw rugs.          Immunization Status: ** >10 years since last Td booster; Age>60, no shingles vaccination; Physical Activity: He exercises but less than 20 minutes 3 days per week; Type of diet patient normally eats is described as keto diet Preventative Health updated today.            PHQ-9 Depression Screening: Completed form scanned and in chart; Total Score 1     ROS:     CONSTITUTIONAL:  Negative for fatigue and fever.      EYES:  Negative for blurred vision.      E/N/T:  Positive for nasal congestion and frequent rhinorrhea.   Negative for diminished hearing.      CARDIOVASCULAR:  Negative for chest pain and palpitations.      RESPIRATORY:  Negative for recent cough and dyspnea.      GASTROINTESTINAL:  Negative for abdominal pain, constipation, diarrhea, nausea and vomiting.      GENITOURINARY:  Negative for nocturia and change in urine stream.      MUSCULOSKELETAL:  Positive for arthralgias and back pain.   Negative for myalgias.      NEUROLOGICAL:  Negative for paresthesias and weakness.      PSYCHIATRIC:  Negative for anxiety, depression and sleep disturbance.          Past Medical History / Family History / Social History:         Last Reviewed on 5/25/2021 10:08 AM by Elda Louis    Past Medical History:             PAST MEDICAL HISTORY         Skin cancer: dx'd in ; BCC;     polio as a 8 year old per patient         CURRENT MEDICAL PROVIDERS:    NONE             ADVANCED DIRECTIVES: None         PREVENTIVE HEALTH MAINTENANCE             COLORECTAL CANCER SCREENING: declines colorectal cancer screening, understands reason for testing     Hepatitis C Medicare Screening: was last done 4-11-18; negative     PSA: was last  done 10/16/19 which was abnormal         Surgical History:         Tonsillectomy/Adenoidectomy; at age <6     Other Surgeries:    benign tumors removed from his side in his twentiies;     Negative for    Colonoscopy ( PT DECLINES COLONOSCOPY ON VISIT 8-26-15./pr );         Family History: mom is 103     Father:  at age 78; Cause of death was CVA;  Hypertension     Mother: Hypertension;  Cerebrovascular Accident; Dementia;  Osteoarthritis     Brother(s): 0 brother(s) total     Sister(s): 0 sister(s) total         Social History: homes in [place]     Occupation: Retired (Prior occupation: businessman/ /corporate/)     Marital Status:      Children: 4 children         Tobacco/Alcohol/Supplements:     Last Reviewed on 2021 10:08 AM by Elda Louis    Tobacco: He has never smoked.          Alcohol: Frequency:    occ bourbon;         Substance Abuse History:     Last Reviewed on 2021 10:08 AM by Elda Louis    None         Mental Health History:     Last Reviewed on 2021 10:08 AM by Elda Louis        Communicable Diseases (eg STDs):     Last Reviewed on 2021 10:08 AM by Elda Louis        Current Problems:     Last Reviewed on 2020 01:12 PM by Daniel Sanchez    Low back pain    Pure hyperglyceridemia    Acute pharyngitis, unspecified        Immunizations:     Prevnar 13 (Pneumococcal PCV 13) 10/7/2016    zzFluzone pf-quadrivalent 3 and up 2015    Afluria 2018    Flulaval 10/8/2010    Fluzone High-Dose pf (>=65 yr) 10/16/2019    PNEUMOVAX 23 (Pneumococcal PPV23) 3/5/2018        Allergies:     Last Reviewed on 2020 11:03 AM by Sandra Rasheed    Cipro:      Adhesive tape:      Novocain:          Current Medications:     Last Reviewed on 2020 11:02 AM by Sandra Rasheed    Loratadine     Glucosamine     Saw Palmetto     multivitamin oral capsule [Take 1 capsule(s) by mouth daily]    stinging  nettle     cyclobenzaprine 10 mg oral tablet [take 1 tablet (10 mg) by oral route 2 times per day prn]    naproxen 250 mg oral tablet [take 1 tablet (250 mg) by oral route 2 times per day with food]        Objective:        Vitals:         Current: 5/25/2021 10:01:52 AM    Ht:  5 ft, 7 in;  Wt: 203.6 lbs;  BMI: 31.9T: 97.3 F (oral);  BP: 124/61 mm Hg (right arm, sitting);  P: 60 bpm (right arm (BP Cuff), sitting);  sCr: 1.16 mg/dL;  GFR: 54.82        Exams:     PHYSICAL EXAM:     GENERAL: Vitals recorded well developed, well nourished;  well groomed;  no apparent distress;     EYES: extraocular movements intact; conjunctiva and cornea are normal; PERRLA;     RESPIRATORY: normal respiratory rate and pattern with no distress; normal breath sounds with no rales, rhonchi, wheezes or rubs;     CARDIOVASCULAR: normal rate; rhythm is regular;  no systolic murmur; no edema;     GASTROINTESTINAL: nontender; normal bowel sounds;     MUSCULOSKELETAL: normal gait; normal overall tone     NEUROLOGIC: mental status: alert and oriented x 3; reflexes: brachioradialis: 2+; knee jerks: 2+;     PSYCHIATRIC: appropriate affect and demeanor; normal psychomotor function; normal speech pattern;         Assessment:         Z00.00   Encounter for general adult medical examination without abnormal findings       Z13.31   Encounter for screening for depression       E78.1   Pure hyperglyceridemia       Z12.5   Encounter for screening for malignant neoplasm of prostate           ORDERS:         Lab Orders:       [id number]  HTN - [facility] CMP AND LIPID: [id number]  (Send-Out)            APPTO  Appointment need  (In-House)              Procedures Ordered:         Annual wellness visit, includes a PPPS, subsequent visit  (In-House)              Other Orders:         Depression screen negative  (In-House)            1101F  Pt screen for fall risk; document no falls in past year or only 1 fall w/o injury in past year (ROSANA)  (In-House)             1124F  Advance Care Planning discussed and doc in MR; no surrogate named or advance care plan provided  (Send-Out)                      Plan:         Encounter for general adult medical examination without abnormal findingsHe is due for colonoscopy; declines.  He is due for prostate cancer screening; declines.  He is UTD on Pneumovax (3/2018), Prevnar (10/2016).  He is due for Shingrix, Td.  Flu shot not currently indicated.  He has had both COVID vaccines!  He is due for routine labs including HLD panel; ordered.  No fall risk, no memory issues, no signs/symptoms of depression.  He lives with his wife.  He is able to drive and perform ADLs/manages finances independently.   Hearing is adequate.  He has a living will.  Preventive services handout and safety handout were given to him.  Current doctor list updated.  RTC 1 yr.    [facility] PHQ-9 Depression Screening: Completed form scanned and in chart; Total Score 1; Negative Depression Screen ADVANCE DIRECTIVES (Please update in PMH): unknown     FOLLOW-UP: in 1 year:.  Medicare wellness 30 min with Heriberto          Orders:         Annual wellness visit, includes a PPPS, subsequent visit  (In-House)              Depression screen negative  (In-House)            1101F  Pt screen for fall risk; document no falls in past year or only 1 fall w/o injury in past year (ROSANA)  (In-House)            1124F  Advance Care Planning discussed and doc in MR; no surrogate named or advance care plan provided  (Send-Out)            APPTO  Appointment need  (In-House)              Pure hyperglyceridemia    LABORATORY:  Labs ordered to be performed today include HTN/Lipid Panel: CMP, Lipid.            Orders:       [id number]  HTN - [facility] CMP AND LIPID: [id number], [id number]  (Send-Out)              Encounter for screening for malignant neoplasm of prostateDeclined.            Patient Recommendations:        For  Encounter for general adult medical examination without abnormal findings:                     APPOINTMENT INFORMATION:        Monday Tuesday Wednesday Thursday Friday Saturday Sunday            Time:___________________AM  PM   Date:_____________________             Charge Capture:         Primary Diagnosis:     Z00.00  Encounter for general adult medical examination without abnormal findings           Orders:        Annual wellness visit, includes a PPPS, subsequent visit  (In-House)              Depression screen negative  (In-House)            1101F  Pt screen for fall risk; document no falls in past year or only 1 fall w/o injury in past year (ROSANA)  (In-House)            APPTO  Appointment need  (In-House)              Z13.31  Encounter for screening for depression     E78.1  Pure hyperglyceridemia     Z12.5  Encounter for screening for malignant neoplasm of prostate

## 2021-07-01 VITALS
HEART RATE: 67 BPM | WEIGHT: 234 LBS | HEIGHT: 67 IN | BODY MASS INDEX: 36.73 KG/M2 | SYSTOLIC BLOOD PRESSURE: 132 MMHG | DIASTOLIC BLOOD PRESSURE: 86 MMHG | TEMPERATURE: 97.3 F

## 2021-07-01 VITALS
HEART RATE: 61 BPM | HEIGHT: 67 IN | WEIGHT: 231.2 LBS | TEMPERATURE: 97.6 F | SYSTOLIC BLOOD PRESSURE: 131 MMHG | BODY MASS INDEX: 36.29 KG/M2 | DIASTOLIC BLOOD PRESSURE: 78 MMHG

## 2021-07-01 VITALS
HEART RATE: 60 BPM | TEMPERATURE: 97.7 F | BODY MASS INDEX: 37.54 KG/M2 | SYSTOLIC BLOOD PRESSURE: 121 MMHG | WEIGHT: 239.2 LBS | HEIGHT: 67 IN | DIASTOLIC BLOOD PRESSURE: 79 MMHG

## 2021-07-01 VITALS
WEIGHT: 232.7 LBS | SYSTOLIC BLOOD PRESSURE: 132 MMHG | HEIGHT: 67 IN | BODY MASS INDEX: 36.52 KG/M2 | TEMPERATURE: 96.9 F | DIASTOLIC BLOOD PRESSURE: 68 MMHG | HEART RATE: 61 BPM

## 2021-07-02 VITALS
DIASTOLIC BLOOD PRESSURE: 61 MMHG | HEIGHT: 67 IN | HEART RATE: 60 BPM | BODY MASS INDEX: 31.96 KG/M2 | SYSTOLIC BLOOD PRESSURE: 124 MMHG | TEMPERATURE: 97.3 F | WEIGHT: 203.6 LBS

## 2021-10-08 ENCOUNTER — OFFICE VISIT (OUTPATIENT)
Dept: FAMILY MEDICINE CLINIC | Age: 76
End: 2021-10-08

## 2021-10-08 VITALS
BODY MASS INDEX: 30.89 KG/M2 | DIASTOLIC BLOOD PRESSURE: 56 MMHG | HEART RATE: 59 BPM | TEMPERATURE: 98.3 F | HEIGHT: 67 IN | WEIGHT: 196.8 LBS | SYSTOLIC BLOOD PRESSURE: 126 MMHG

## 2021-10-08 DIAGNOSIS — D22.9 ATYPICAL MOLE: Primary | ICD-10-CM

## 2021-10-08 DIAGNOSIS — Z85.828 HISTORY OF MOHS MICROGRAPHIC SURGERY FOR SKIN CANCER: ICD-10-CM

## 2021-10-08 DIAGNOSIS — Z98.890 HISTORY OF MOHS MICROGRAPHIC SURGERY FOR SKIN CANCER: ICD-10-CM

## 2021-10-08 PROCEDURE — 99213 OFFICE O/P EST LOW 20 MIN: CPT | Performed by: NURSE PRACTITIONER

## 2021-10-08 RX ORDER — NAPROXEN SODIUM 220 MG
220 TABLET ORAL DAILY
COMMUNITY

## 2021-10-08 NOTE — PROGRESS NOTES
"Chief Complaint  Jeff Atkinson presents to [facility] for Nevus (skin check )    Subjective          Skin Lesion: Patient complains of a skin lesion of the face, shoulder and back. The lesion has been present for unknown years. Lesion has changed in past few months. Symptoms associated with the lesion are: darkening color, itching, bleeding, tendency to be traumatized, none. Patient denies none, n/a.          Review of Systems      No Known Allergies   History reviewed. No pertinent past medical history.  Current Outpatient Medications   Medication Sig Dispense Refill   • naproxen sodium (ALEVE) 220 MG tablet Take 220 mg by mouth Daily.       No current facility-administered medications for this visit.     History reviewed. No pertinent surgical history.   Social History     Tobacco Use   • Smoking status: Never Smoker   • Smokeless tobacco: Never Used   Substance Use Topics   • Alcohol use: Not on file   • Drug use: Not on file     History reviewed. No pertinent family history.  Health Maintenance Due   Topic Date Due   • COLORECTAL CANCER SCREENING  Never done   • COVID-19 Vaccine (1) Never done   • TDAP/TD VACCINES (1 - Tdap) Never done   • ZOSTER VACCINE (1 of 2) Never done   • Pneumococcal Vaccine 65+ (1 of 1 - PPSV23) Never done   • INFLUENZA VACCINE  08/01/2021   • HEPATITIS C SCREENING  Never done        There is no immunization history on file for this patient.     Objective     Vitals:    10/08/21 0831 10/08/21 0836   BP: 147/66 126/56   BP Location: Left arm Left arm   Patient Position: Sitting Sitting   Pulse: 58 59   Temp: 98.3 °F (36.8 °C)    Weight: 89.3 kg (196 lb 12.8 oz)    Height: 170.2 cm (67.01\")      Body mass index is 30.82 kg/m².     Physical Exam  Vitals reviewed.   Constitutional:       Appearance: Normal appearance.   HENT:      Head: Normocephalic.   Eyes:      Pupils: Pupils are equal, round, and reactive to light.   Cardiovascular:      Rate and " Rhythm: Normal rate and regular rhythm.      Heart sounds: No murmur heard.     Pulmonary:      Effort: Pulmonary effort is normal.      Breath sounds: Normal breath sounds.   Musculoskeletal:         General: Normal range of motion.   Skin:     Findings: Lesion present.   Neurological:      Mental Status: He is alert.   Psychiatric:         Mood and Affect: Mood normal.         Behavior: Behavior normal.           Result Review :                               Assessment and Plan      Diagnoses and all orders for this visit:    1. Atypical mole (Primary)  Comments:   areas of concern from the patient with multiple areas .  will send to derm for skin check given his history of basal cell  Orders:  -     Ambulatory Referral to Dermatology    2. History of Mohs micrographic surgery for skin cancer  Comments:  referral to Dr. Montano  Orders:  -     Ambulatory Referral to Dermatology              Follow Up     No follow-ups on file.

## 2021-10-27 ENCOUNTER — OFFICE VISIT (OUTPATIENT)
Dept: FAMILY MEDICINE CLINIC | Age: 76
End: 2021-10-27

## 2021-10-27 VITALS
BODY MASS INDEX: 31.08 KG/M2 | DIASTOLIC BLOOD PRESSURE: 59 MMHG | HEART RATE: 62 BPM | SYSTOLIC BLOOD PRESSURE: 120 MMHG | HEIGHT: 67 IN | TEMPERATURE: 97.8 F | WEIGHT: 198 LBS

## 2021-10-27 DIAGNOSIS — R93.0 MULTIPLE LESIONS ON CT OF BRAIN AND SPINE: Primary | ICD-10-CM

## 2021-10-27 DIAGNOSIS — S09.93XA FACIAL TRAUMA, INITIAL ENCOUNTER: ICD-10-CM

## 2021-10-27 DIAGNOSIS — R93.7 MULTIPLE LESIONS ON CT OF BRAIN AND SPINE: Primary | ICD-10-CM

## 2021-10-27 DIAGNOSIS — M25.511 ACUTE PAIN OF RIGHT SHOULDER: ICD-10-CM

## 2021-10-27 DIAGNOSIS — Z23 ENCOUNTER FOR IMMUNIZATION: ICD-10-CM

## 2021-10-27 PROBLEM — G89.29 CHRONIC LOW BACK PAIN: Status: ACTIVE | Noted: 2021-10-27

## 2021-10-27 PROBLEM — M54.50 CHRONIC LOW BACK PAIN: Status: ACTIVE | Noted: 2021-10-27

## 2021-10-27 PROBLEM — E78.2 MIXED HYPERLIPIDEMIA: Status: ACTIVE | Noted: 2021-10-27

## 2021-10-27 PROCEDURE — 90686 IIV4 VACC NO PRSV 0.5 ML IM: CPT | Performed by: FAMILY MEDICINE

## 2021-10-27 PROCEDURE — 90471 IMMUNIZATION ADMIN: CPT | Performed by: FAMILY MEDICINE

## 2021-10-27 PROCEDURE — G0008 ADMIN INFLUENZA VIRUS VAC: HCPCS | Performed by: FAMILY MEDICINE

## 2021-10-27 PROCEDURE — 90714 TD VACC NO PRESV 7 YRS+ IM: CPT | Performed by: FAMILY MEDICINE

## 2021-10-27 PROCEDURE — 99214 OFFICE O/P EST MOD 30 MIN: CPT | Performed by: FAMILY MEDICINE

## 2021-10-27 NOTE — PROGRESS NOTES
"Chief Complaint  Fall (10/26/21)    Subjective          Jeff Atkinson presents to [facility] today for follow-up on a fall that took him to [facility] on 10/26/2021.  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  He got overheated and face-planted onto the ground.  He seems to have hit the ground primarily on the right side with the most pain in the right shoulder and right lateral ribs.  He has a black eye of the right eye.  Still having a lot of pain and some numbness in the right face.    Blood work was unremarkable.  He had x-ray of the right ribs that showed a mild cortical irregularity of the right second rib that could represent either nondisplaced fracture versus artifact.  X-ray of the shoulder was negative for fracture as was x-ray of the nasal bones.  He also had CT head done that showed no acute intracranial abnormality however, I did note lytic lesions involving the bilateral frontal cortex.  Multiple myeloma was in the differential.  EKG that was done indicated nondiagnostic inferior Q waves with inability to exclude old septal infarct.      Current Outpatient Medications:   •  naproxen sodium (ALEVE) 220 MG tablet, Take 220 mg by mouth Daily., Disp: , Rfl:     Allergies:  Ciprofloxacin      Objective   Vital Signs:   /59 (BP Location: Right arm, Patient Position: Sitting, Cuff Size: Large Adult)   Pulse 62   Temp 97.8 °F (36.6 °C) (Oral)   Ht 170.2 cm (67.01\")   Wt 89.8 kg (198 lb)   BMI 31.00 kg/m²     Physical Exam  Vitals reviewed.   Constitutional:       General: He is not in acute distress.     Appearance: Normal appearance. He is well-developed.   HENT:      Head: Normocephalic and atraumatic.      Right Ear: External ear normal.      Left Ear: External ear normal.      Nose: Nose normal.      Mouth/Throat:      Mouth: Mucous membranes are moist.   Eyes:      Extraocular Movements: Extraocular movements intact.      Conjunctiva/sclera:      Right " eye: Hemorrhage present.      Left eye: No hemorrhage.     Pupils: Pupils are equal, round, and reactive to light.   Cardiovascular:      Rate and Rhythm: Normal rate and regular rhythm.      Heart sounds: No murmur heard.      Pulmonary:      Effort: Pulmonary effort is normal.      Breath sounds: Normal breath sounds. No wheezing, rhonchi or rales.   Abdominal:      General: Bowel sounds are normal. There is no distension.      Palpations: Abdomen is soft.      Tenderness: There is no abdominal tenderness.   Musculoskeletal:         General: Normal range of motion.   Skin:     Findings: Bruising (Extensive over the right face with a hematoma over the right eyebrow) present.   Neurological:      Mental Status: He is alert.   Psychiatric:         Mood and Affect: Affect normal.             Assessment and Plan    Diagnoses and all orders for this visit:    1. Multiple lesions on CT of brain and spine (Primary)  Assessment & Plan:  Jeff had incidental finding of multiple lytic lesions in the frontal cortex on the CT  head that he had done in the [facility] on 10/26/2021.  Multiple myeloma was listed in the differential by the radiologist.  His accompanying lab work was negative for crab criteria, but we we will go ahead and evaluate further with MRI of the brain.  Order placed    Orders:  -     MRI Brain With & Without Contrast; Future    2. Facial trauma, initial encounter  Assessment & Plan:  Visit to evaluate facial trauma after he was seen in the ED at [facility].  He is continuing to have pain and numbness in the right face.  X-ray of the facial bones done at [facility] was negative for fracture but it was recommended that a CT of the facial bones be obtained if symptoms do not improve, and it seems that he is still having significant problems.  I will go ahead and get him set up for a CT facial bones scan.  He was offered a Td shot at the ED but refused.  He is willing to take one today so we will get  that for him in addition to his yearly flu shot.    Orders:  -     CT Facial Bones With Contrast; Future  -     Td Vaccine Greater Than or Equal To 8yo With Preservative IM    3. Acute pain of right shoulder  Assessment & Plan:  He continues to have pain in the right shoulder since his fall.  It sounds like he had pretty hard, so it is not surprising that he is still sore and hurting, but we will get him set up for some rehab to see if we can speed up his recovery.    Orders:  -     Ambulatory Referral to Physical Therapy Evaluate and treat    4. Encounter for immunization  -     FluLaval/Fluarix/Fluzone >6 Months      Follow Up   No follow-ups on file.  Patient was given instructions and counseling regarding his condition or for health maintenance advice. Please see specific information pulled into the AVS if appropriate.

## 2021-10-27 NOTE — ASSESSMENT & PLAN NOTE
Jeff had incidental finding of multiple lytic lesions in the frontal cortex on the CT  head that he had done in the spring [facility] ED on 10/26/2021.  Multiple myeloma was listed in the differential by the radiologist.  His accompanying lab work was negative for crab criteria, but we we will go ahead and evaluate further with MRI of the brain.  Order placed

## 2021-10-27 NOTE — ASSESSMENT & PLAN NOTE
He continues to have pain in the right shoulder since his fall.  It sounds like he had pretty hard, so it is not surprising that he is still sore and hurting, but we will get him set up for some rehab to see if we can speed up his recovery.

## 2021-10-27 NOTE — ASSESSMENT & PLAN NOTE
Visit to evaluate facial trauma after he was seen in the ED at [facility].  He is continuing to have pain and numbness in the right face.  X-ray of the facial bones done at [facility] was negative for fracture but it was recommended that a CT of the facial bones be obtained if symptoms do not improve, and it seems that he is still having significant problems.  I will go ahead and get him set up for a CT facial bones scan.  He was offered a Td shot at the ED but refused.  He is willing to take one today so we will get that for him in addition to his yearly flu shot.

## 2021-11-16 ENCOUNTER — OFFICE VISIT (OUTPATIENT)
Dept: FAMILY MEDICINE CLINIC | Age: 76
End: 2021-11-16

## 2021-11-16 ENCOUNTER — HOSPITAL ENCOUNTER (OUTPATIENT)
Dept: MRI IMAGING | Facility: HOSPITAL | Age: 76
Discharge: HOME | End: 2021-11-16
Admitting: FAMILY MEDICINE

## 2021-11-16 VITALS — SYSTOLIC BLOOD PRESSURE: 128 MMHG | TEMPERATURE: 98.1 F | HEART RATE: 59 BPM | DIASTOLIC BLOOD PRESSURE: 70 MMHG

## 2021-11-16 DIAGNOSIS — R93.7 MULTIPLE LESIONS ON CT OF BRAIN AND SPINE: ICD-10-CM

## 2021-11-16 DIAGNOSIS — S06.5X0A TRAUMATIC SUBDURAL HEMATOMA WITHOUT LOSS OF CONSCIOUSNESS, INITIAL ENCOUNTER (HCC): Primary | ICD-10-CM

## 2021-11-16 DIAGNOSIS — R93.0 MULTIPLE LESIONS ON CT OF BRAIN AND SPINE: ICD-10-CM

## 2021-11-16 LAB — CREAT BLDA-MCNC: 1.2 MG/DL

## 2021-11-16 PROCEDURE — 82565 ASSAY OF CREATININE: CPT

## 2021-11-16 PROCEDURE — 0 GADOBENATE DIMEGLUMINE 529 MG/ML SOLUTION: Performed by: FAMILY MEDICINE

## 2021-11-16 PROCEDURE — 70553 MRI BRAIN STEM W/O & W/DYE: CPT

## 2021-11-16 PROCEDURE — A9577 INJ MULTIHANCE: HCPCS | Performed by: FAMILY MEDICINE

## 2021-11-16 PROCEDURE — 99213 OFFICE O/P EST LOW 20 MIN: CPT | Performed by: FAMILY MEDICINE

## 2021-11-16 RX ADMIN — GADOBENATE DIMEGLUMINE 17 ML: 529 INJECTION, SOLUTION INTRAVENOUS at 14:03

## 2021-11-16 NOTE — ASSESSMENT & PLAN NOTE
Traumatic subdural hematoma identified on MRI of the brain following a fall with impact onto concrete on 10/26/2021.  Given the absence of the bleed on the initial scan and presence on scan today, it would be prudent to send Jeff to the hospital ED for emergent evaluation by neurosurgery and admission for monitoring of the subdural hematoma.  [facility] [place], for the sake of continuity.  This is fine.  We will provide him today with the report of the MRI as well as the scanned images and he will head that way as soon as his friend gets here to give him a ride.

## 2021-11-16 NOTE — PROGRESS NOTES
"Chief Complaint  subdural hematoma on MRI    Subjective          Jeff Atkinson presents to [facility] today for follow-up on MRI results.  He was over at [facility] earlier today getting an MRI of the brain done after a CT head at [facility] in [place] came back showing lytic lesions in the bilateral frontal lobes.  The CT head was done after he fell at [facility], striking his head on concrete on 10/26/2021.  Imaging done at that time did not show a bleed.  On his MRI of the brain today, Dr. Arias neuroradiology called to give report that he was showing a right-sided subdural hematoma, 1.2 cm in size.  Jeff was brought back to the exam room to discuss his results and neck steps.  He denies any kind of paresthesias or numbness or weakness but states \"I just haven't felt good.\"      Current Outpatient Medications:   •  naproxen sodium (ALEVE) 220 MG tablet, Take 220 mg by mouth Daily., Disp: , Rfl:   No current facility-administered medications for this visit.    Allergies:  Ciprofloxacin      Objective   Vital Signs:   /70   Pulse 59   Temp 98.1 °F (36.7 °C)     Physical Exam  Constitutional:       Appearance: Normal appearance.   HENT:      Head: Normocephalic and atraumatic.   Eyes:      Extraocular Movements: Extraocular movements intact.      Conjunctiva/sclera: Conjunctivae normal.   Pulmonary:      Effort: Pulmonary effort is normal. No respiratory distress.   Musculoskeletal:         General: Normal range of motion.   Skin:     General: Skin is warm and dry.   Neurological:      General: No focal deficit present.      Mental Status: He is alert and oriented to person, place, and time.   Psychiatric:         Mood and Affect: Mood normal.         Behavior: Behavior normal.         Thought Content: Thought content normal.         Judgment: Judgment normal.             Assessment and Plan    Diagnoses and all orders for this visit:    1. Traumatic " subdural hematoma without loss of consciousness, initial encounter ([facility]) (Primary)  Assessment & Plan:  Traumatic subdural hematoma identified on MRI of the brain following a fall with impact onto concrete on 10/26/2021.  Given the absence of the bleed on the initial scan and presence on scan today, it would be prudent to send Jeff to the hospital ED for emergent evaluation by neurosurgery and admission for monitoring of the subdural hematoma.  [facility] [place], for the sake of continuity.  This is fine.  We will provide him today with the report of the MRI as well as the scanned images and he will head that way as soon as his friend gets here to give him a ride.        Follow Up   No follow-ups on file.  Patient was given instructions and counseling regarding his condition or for health maintenance advice. Please see specific information pulled into the AVS if appropriate.

## 2022-10-19 ENCOUNTER — TELEPHONE (OUTPATIENT)
Dept: FAMILY MEDICINE CLINIC | Age: 77
End: 2022-10-19

## 2022-10-19 NOTE — TELEPHONE ENCOUNTER
Caller: Jeff Atkinson    Relationship: Self    Best call back number: [phone]    What is the best time to reach you: ANY    Who are you requesting to speak with (clinical staff, provider,  specific staff member): CLINICAL STAFF    What was the call regarding: PATIENT CALLED STATING THAT HE WOULD LIKE TO SPEAK TO  A NURSE ABOUT WHETHER OR NOT HE CAN GET A FLU SHOT SINCE HE JUST GOT OVER COVID A WEEK AGO.    Do you require a callback: YES

## 2022-10-20 NOTE — TELEPHONE ENCOUNTER
Yes, no waiting period is required currently.  As soon as a person is out of quarantine from a COVID infection, they may proceed with vaccination.  Thanks, CAIN

## 2024-01-31 ENCOUNTER — TELEPHONE (OUTPATIENT)
Dept: FAMILY MEDICINE CLINIC | Age: 79
End: 2024-01-31

## 2024-01-31 NOTE — TELEPHONE ENCOUNTER
Caller: Jeff Atkinson    Relationship to patient: Self    Best call back number: [phone]    Patient is needing: PATIENT CALLED REQUESTING TO SPEAK WITH CLINICAL STAFF. PATIENT STATES HE HAS [employer] INSURANCE AND WAS RECENTLY INFORMED HE WOULD NOT BE IN COVERAGE WITH MD MATTHEWS ANYMORE. PATIENT STATES HE DOES A SECONDARY INSURANCE THROUGH THE [facility] FOR BEING A  AND IS WANTING TO KNOW IF HE WOULD STILL BE ABLE TO BE SEEN AND CHARGED THROUGH THAT INSURANCE INSTEAD. PATIENT JUST STATED IT WAS THROUGH THE [facility] AND IF NOT WOULD [facility] HAVE ANY RECOMMENDATIONS ON WHERE/WHO WOULD TAKE HIS TYPE OF INSURANCE.

## 2024-01-31 NOTE — TELEPHONE ENCOUNTER
Pt was called to let him know that we could still see him and that his [employer] insurance would be the insurance that is billed first and whatever that doesn't cover would then be billed to his [facility] insurance. He was also informed that if both coverages were billed and there was still a balance then he would be responsible for that payment. He was informed that he could call [employer] to find out what his out of network benefits would be.

## 2024-03-22 NOTE — PROGRESS NOTES
Addendum  created 03/22/24 1651 by Kendall Ch MD    Flowsheet accepted, Intraprocedure Event edited, Intraprocedure Meds edited, Intraprocedure Staff edited, Patient device added       Jeff Atkinson 1945     Office/Outpatient Visit    Visit Date: Wed, Apr 11, 2018 10:00 am    Provider: Stephanie Simental N.P. (Assistant: Gabrielle Norwood MA)    Location: [facility]        Electronically signed by Stephanie Simental N.P. on  04/11/2018 02:08:45 PM                             SUBJECTIVE:        CC:     Mr. Atkinson is a 72 year old White male.  Medicare wellness         HPI:         Mr. Atkinson is here for a Medicare wellness visit.  Individual and family medical history was reviewed and updated A list of current providers and suppliers reviewed and updated A current height, weight, BMI, blood pressure, and pulse were recorded in the vitals section of the note and have been reviewed A review of cognitive impairment was performed and was negative.  A review of functional ability and level of safety was performed and was negative In regard to hearing, he reports having trouble hearing the TV/radio when others do not and having to strain to hear or understand conversations, but not wearing hearing aid(s).  Concerning home safety, He denies his home having throw rugs, poor lighting, a slippery bath or shower, grab bars in the bath, handrails on stairs and functioning smoke alarms.      Immunization Status: Age>60, no shingles vaccination; pt unsure about last tdap;     Physical Activity: Exercises at least 3 times per week; Has not had any falls or only one fall without injury in the past year.  Advance Care Directive updated today in [facility] Tobacco: He has never smoked.    Preventative Health updated today.          He denies dissatisfaction with his life, getting bored often, feeling helpless, preferring to stay at home rather than going out and doing new things and feeling worthless the way he is now.  Total score is 0     ROS:     CONSTITUTIONAL:  Negative for chills and fever.      EYES:  Negative for blurred vision.      E/N/T:  Negative for nasal congestion and sore throat.       CARDIOVASCULAR:  Negative for chest pain and palpitations.      RESPIRATORY:  Negative for recent cough and dyspnea.      GASTROINTESTINAL:  Negative for abdominal pain, nausea and vomiting.      MUSCULOSKELETAL:  Positive for back pain ( low back pain ).   Negative for myalgias.  flared over the last 3-4 years, sitting in reclines helps, intermittent /golf aggrevates     INTEGUMENTARY:  Positive for insect bites on ankles, have cleared up, worried about tropical diseases.   Negative for atypical mole(s).  (had bites by sand flies/worried about leprosy/returned from [facility] , leshmaniasis concerns )     NEUROLOGICAL:  Negative for paresthesias and weakness.      PSYCHIATRIC:  Negative for anxiety and depression.          PMH/FMH/SH:     Last Reviewed on 2018 10:20 AM by Stephanie Simental    Past Medical History:             PAST MEDICAL HISTORY         Skin cancer: dx'd in ; BCC;         CURRENT MEDICAL PROVIDERS:    NONE             ADVANCED DIRECTIVES: None         Surgical History:         Tonsillectomy/Adenoidectomy; at age <5     Other Surgeries:    benign tumors removed from his side in his twentiies;     Negative for    Colonoscopy ( PT DECLINES COLONOSCOPY ON VISIT 8-26-15./pr );         Family History: mom is 103     Father:  at age 78; Cause of death was CVA;  Hypertension     Mother: Hypertension;  Cerebrovascular Accident; Dementia;  Osteoarthritis     Brother(s): 0 brother(s) total     Sister(s): 0 sister(s) total         Social History: homes in [place] and [place]     Occupation: Retired (Prior occupation: businessman/ /corporate/)     Marital Status:      Children: 4 children         Tobacco/Alcohol/Supplements:     Last Reviewed on 2018 10:04 AM by Gabrielle Norwood    Tobacco: He has never smoked.          Alcohol: Frequency:    occ bourbon;         Substance Abuse History:     Last Reviewed on 10/07/2016 12:55 PM by  Elda Louis    None         Mental Health History:     Last Reviewed on 10/07/2016 12:55 PM by Elda Louis        Communicable Diseases (eg STDs):     Last Reviewed on 10/07/2016 12:55 PM by Elda Louis            Immunizations:     Prevnar 13 (Pneumococcal PCV 13) 10/7/2016     zzFluzone pf-quadrivalent 3 and up 9/25/2015     Afluria 2/6/2018     Flulaval 10/8/2010     PNEUMOVAX 23 (Pneumococcal PPV23) 3/5/2018         Allergies:     Last Reviewed on 4/11/2018 10:04 AM by Gabrielle Norwood    Cipro:    Novocain:    Adhesive tape:        Current Medications:     Last Reviewed on 4/11/2018 10:05 AM by Gabrielle Norwood    stinging nettle     Glucosamine     Multivitamins Capsules Take 1 capsule(s) by mouth daily     Saw Palmetto     Loratadine         OBJECTIVE:        Vitals:         Current: 4/11/2018 10:04:10 AM    Ht:  5 ft, 7 in;  Wt: 232.7 lbs;  BMI: 36.4    T: 96.9 F (oral);  BP: 132/68 mm Hg (left arm, sitting);  P: 61 bpm (left arm (BP Cuff), sitting)    VA: 20/25 OD, 20/25 OS (near, with correction)        Exams:     PHYSICAL EXAM:     GENERAL: Vitals recorded well developed, well nourished;  well groomed;  no apparent distress;     EYES: lids and lacrimal system are normal in appearance; extraocular movements intact; conjunctiva and cornea are normal; PERRLA;     E/N/T: EARS: external auditory canal occluded by cerumen bilaterally;  NOSE:  normal nasal mucosa, septum, turbinates, and sinuses; OROPHARYNX:  normal mucosa, dentition, gingiva, and posterior pharynx;     NECK:  supple, full ROM; no thyromegaly; no carotid bruits;     RESPIRATORY: normal respiratory rate and pattern with no distress; normal breath sounds with no rales, rhonchi, wheezes or rubs;     CARDIOVASCULAR: normal rate; rhythm is regular;  no systolic murmur; no edema;     GASTROINTESTINAL: nontender, nondistended; no hepatosplenomegaly or masses; no bruits; nontender; normal bowel sounds; no organomegaly;     LYMPHATIC:  no enlargement of cervical or facial nodes;     SKIN:  no significant rashes or lesions; no suspicious moles;     MUSCULOSKELETAL: pain with range of motion in: back extension;     NEUROLOGIC: GROSSLY INTACT     PSYCHIATRIC: affect/demeanor: anxious;         ASSESSMENT           V70.0   Z00.00  Health checkup              DDx:     V79.0   Z13.89  Screening for depression              DDx:     V76.44   Z12.5  Screening for prostate cancer              DDx:     V69.8   Z72.89  Other problems related to lifestyle              DDx:     724.2   M54.5  Low back pain              DDx:     V17.1   Z82.3  Family history of stroke (CVA)              DDx:     380.4   H61.23  Cerumen impaction              DDx:         ORDERS:         Lab Orders:       *  [facility] Medicare screening PSA  (Send-Out)           [facility] Hepatitis C AB (Medicare Screen)  (Send-Out)         30299  COMP [facility] Comp. Metabolic Panel  (Send-Out)         49316  [facility] automated, with micro  (Send-Out)         64278  DP [facility] Lipid Panel  (Send-Out)           Procedures Ordered:       80489  Removal of impacted cerumen BILATERAL (NURSE)  (In-House)           Annual wellness visit, includes a PPPS, subsequent visit  (In-House)           Other Orders:         Depression screen negative  (In-House)                   PLAN:          Health checkup I recommend adacel and shingles vaccine he says his insurance does not cover, may check with the [facility], order given to patient         COUNSELING was provided today regarding the following topics: healthy eating habits, regular exercise, use of seat belts, fall prevention, Advanced Directives information given, and ADVISED TO SEE AN EYE DOCTOR AND A DENTIST REGULARLY.      FOLLOW-UP: Schedule follow-up appointments on a p.r.n. basis.      RECOMMENDATIONS given include: Screening Colonoscopy declines.  has a colo guard kit at home and does not want to collect for screening this  test either           Orders:         Annual wellness visit, includes a PPPS, subsequent visit  (In-House)             Patient Education Handouts:       Physical Exam 65+ year, Male.1           Screening for depression     MIPS Negative Depression Screen           Orders:         Depression screen negative  (In-House)            Screening for prostate cancer     LABORATORY:  Labs ordered to be performed today include PSA Screening Medicare patients.            Orders:       *  [facility] Medicare screening PSA  (Send-Out)            Other problems related to lifestyle     LABORATORY:  Labs ordered to be performed today include Hepatitis C Ab (Medicare Screen).            Orders:         [facility] Hepatitis C AB (Medicare Screen)  (Send-Out)            Low back pain     LABORATORY:  Labs ordered to be performed today include Comprehensive metabolic panel and urinalysis with micro.            Orders:       11563  [facility] Comp. Metabolic Panel  (Send-Out)         10003  [facility] with [facility]  (Send-Out)            Family history of stroke (CVA)     LABORATORY:  Labs ordered to be performed today include lipid panel.            Orders:       12779  [facility] Lipid Panel  (Send-Out)            Cerumen impaction         TESTS/PROCEDURES:  Will proceed with Cerumen Removal--by Nurse: Both ears, to be performed/scheduled now.            Orders:       43892  Removal of impacted cerumen BILATERAL (NURSE)  (In-House)               Patient Recommendations:        For  Health checkup:     Limit dietary intake of fat (especially saturated fat) and cholesterol.  Eat a variety of foods, including plenty of fruits, vegetables, and grain containg fiber, limit fat intake to 30% of total calories. Balance caloric intake with energy expended. Maintaining regular physical activity is advised to help prevent heart disease, hypertension, diabetes, and obesity. Always use shoulder/lap restraints  when driving or riding in a vehicle, even those equipped with air bags. Regularly exercise within recommended guidelines, especially to maintain balance. Remove obstacles in walkways at home.  Use non-skid material for bathtub safety.  Schedule follow-up appointments as needed.              CHARGE CAPTURE           **Please note: ICD descriptions below are intended for billing purposes only and may not represent clinical diagnoses**        Primary Diagnosis:         V70.0 Health checkup            Z00.00    Encounter for general adult medical examination without abnormal findings              Orders:             Annual wellness visit, includes a PPPS, subsequent visit  (In-House)           V79.0 Screening for depression            Z13.89    Encounter for screening for other disorder              Orders:             Depression screen negative  (In-House)           V76.44 Screening for prostate cancer            Z12.5    Encounter for screening for malignant neoplasm of prostate    V69.8 Other problems related to lifestyle            Z72.89    Other problems related to lifestyle    724.2 Low back pain            M54.5    Low back pain    V17.1 Family history of stroke (CVA)            Z82.3    Family history of stroke    380.4 Cerumen impaction            H61.23    Impacted cerumen, bilateral              Orders:          34254   Removal of impacted cerumen BILATERAL (NURSE)  (In-House)               ADDENDUMS:      ____________________________________    Addendum: 04/13/2018 01:41 PM - Verónica Srivastava         Visit Note Faxed to:        [facility]  (Surgery, Urological); Number [phone]     Health Summary Faxed to:        [facility]  (Surgery, Urological); Number [phone]